# Patient Record
Sex: FEMALE | Race: ASIAN | NOT HISPANIC OR LATINO
[De-identification: names, ages, dates, MRNs, and addresses within clinical notes are randomized per-mention and may not be internally consistent; named-entity substitution may affect disease eponyms.]

---

## 2019-03-07 ENCOUNTER — RESULT REVIEW (OUTPATIENT)
Age: 54
End: 2019-03-07

## 2021-02-11 ENCOUNTER — APPOINTMENT (OUTPATIENT)
Dept: NEUROSURGERY | Facility: CLINIC | Age: 56
End: 2021-02-11

## 2021-06-17 ENCOUNTER — APPOINTMENT (OUTPATIENT)
Dept: NEUROSURGERY | Facility: CLINIC | Age: 56
End: 2021-06-17

## 2021-07-13 ENCOUNTER — APPOINTMENT (OUTPATIENT)
Dept: NEUROSURGERY | Facility: CLINIC | Age: 56
End: 2021-07-13
Payer: MEDICAID

## 2021-07-13 VITALS
DIASTOLIC BLOOD PRESSURE: 94 MMHG | TEMPERATURE: 97.3 F | OXYGEN SATURATION: 98 % | SYSTOLIC BLOOD PRESSURE: 135 MMHG | BODY MASS INDEX: 33.99 KG/M2 | HEART RATE: 105 BPM | WEIGHT: 180 LBS | HEIGHT: 61 IN

## 2021-07-13 PROCEDURE — 99203 OFFICE O/P NEW LOW 30 MIN: CPT

## 2021-07-14 NOTE — ASSESSMENT
[FreeTextEntry1] : She has severe pain after multilevel fusion. In the new MRI there is anterolisthesis of C7 on T1. I will have her do xrays with flexion extension to rule out instability

## 2021-07-14 NOTE — PHYSICAL EXAM
[Person] : oriented to person [Place] : oriented to place [Time] : oriented to time [Motor Handedness Right-Handed] : the patient is right hand dominant [Motor Strength Shoulders Right Weakness] : normal shoulder strength [Motor Strength Upper Extremities Right] : normal arm strength [Motor Strength Forearms Right Weakness] : normal forearm strength [Motor Strength Fingers Right Hand Weakness] : normal finger strength [Motor Strength Shoulders Left Weakness] : normal shoulder strength [Motor Strength Upper Extremities Left] : normal arm strength [Motor Strength Forearms Left Weakness] : normal forearm strength [Motor Strength Fingers Left Hand Weakness] : normal finger strength [4] : thumb flexion 4/5 [Sensation Tactile Decrease] : light touch was intact [Limited Balance] : the patient's balance was impaired

## 2021-07-14 NOTE — HISTORY OF PRESENT ILLNESS
[FreeTextEntry1] : She started having pain in 2008 and thereafter she had surgery in 10/2008  for neck pain a tingling of the right arm. The tingling got better.the neck pain stayed the same. The pain is getting worse, she feels pain in the arms and legs mainly when she lays down. The worse pain is in the back of the head, neck and shoulder. She has had 3 spinal injections in the last 10 years the last one was yesterday Dr Ortiz. She has had PT and has no help.Currently she has numbness and pain related to he amount of activity. The neck pain severity is 9/10 the worse. She has balance problems, and dexterity problems. Nothing helps the pain and it is worse with activity. She had a MRI and would like to go over the results

## 2021-10-21 ENCOUNTER — APPOINTMENT (OUTPATIENT)
Dept: NEUROLOGY | Facility: CLINIC | Age: 56
End: 2021-10-21

## 2022-02-17 ENCOUNTER — APPOINTMENT (OUTPATIENT)
Dept: NEUROSURGERY | Facility: CLINIC | Age: 57
End: 2022-02-17
Payer: MEDICARE

## 2022-02-17 VITALS
HEART RATE: 99 BPM | SYSTOLIC BLOOD PRESSURE: 131 MMHG | HEIGHT: 61 IN | WEIGHT: 179 LBS | BODY MASS INDEX: 33.79 KG/M2 | OXYGEN SATURATION: 98 % | DIASTOLIC BLOOD PRESSURE: 86 MMHG | TEMPERATURE: 97.8 F

## 2022-02-17 DIAGNOSIS — G89.29 CERVICALGIA: ICD-10-CM

## 2022-02-17 DIAGNOSIS — M54.2 CERVICALGIA: ICD-10-CM

## 2022-02-17 DIAGNOSIS — Z98.1 ARTHRODESIS STATUS: ICD-10-CM

## 2022-02-17 DIAGNOSIS — M54.12 RADICULOPATHY, CERVICAL REGION: ICD-10-CM

## 2022-02-17 PROCEDURE — 99213 OFFICE O/P EST LOW 20 MIN: CPT

## 2022-02-17 NOTE — REVIEW OF SYSTEMS
[As Noted in HPI] : as noted in HPI [Arm Weakness] : arm weakness [Numbness] : numbness [Tingling] : tingling [Abnormal Sensation] : an abnormal sensation [Negative] : Heme/Lymph

## 2022-02-18 NOTE — HISTORY OF PRESENT ILLNESS
[FreeTextEntry1] : 02/17:22: The patient is a 55 y/o, female, with a hx of ACDF C4-7 with chronic neck pain. She rates the pain a 10/10. She has pain radiating to b/l UE. She has numbness, weakness, and tingling of arms and hands. Her pain is worse lying down. She feels as if she has no support in her neck. She also has tingling of b/l LE. She has LOB. She has done PT and received many injections to her cervical spine. Last injection 11/21 by Dr. Ortiz\par \par 07/21: She started having pain in 2008 and thereafter she had surgery in 10/2008  for neck pain a tingling of the right arm. The tingling got better.the neck pain stayed the same. The pain is getting worse, she feels pain in the arms and legs mainly when she lays down. The worse pain is in the back of the head, neck and shoulder. She has had 3 spinal injections in the last 10 years the last one was yesterday Dr Ortiz. She has had PT and has no help.Currently she has numbness and pain related to he amount of activity. The neck pain severity is 9/10 the worse. She has balance problems, and dexterity problems. Nothing helps the pain and it is worse with activity. She had a MRI and would like to go over the results

## 2022-02-18 NOTE — REASON FOR VISIT
[Follow-Up: _____] : a [unfilled] follow-up visit [Consultation] : a consultation visit [FreeTextEntry1] : neck pain

## 2022-02-18 NOTE — ASSESSMENT
[FreeTextEntry1] : The patient is a 55 y/o, female with a hx of multilevel ACDF. She has weakness of UE and 10/10 pain. The pt has attempted conservative management with PT and injections, but surgery at this time is not recommended due to degree of severity and possible complications. Risks associated with surgery discussed. Counseled pt, advised she continue PT. Recommend activity modification and possible injection.

## 2023-07-26 NOTE — PHYSICAL EXAM
Statement Selected [Person] : oriented to person [Place] : oriented to place [Time] : oriented to time [Motor Handedness Right-Handed] : the patient is right hand dominant [Limited Balance] : the patient's balance was impaired [General Appearance - Alert] : alert [General Appearance - In No Acute Distress] : in no acute distress [Oriented To Time, Place, And Person] : oriented to person, place, and time [Impaired Insight] : insight and judgment were intact [Affect] : the affect was normal [Sensation Tactile Decrease] : light touch was intact [Abnormal Walk] : normal gait [2+] : Patella left 2+ [Restricted] : was restricted [Pain] : was painful [No Visual Abnormalities] : no visible abnormailities [No Tenderness to Palpation] : no spine tenderness on palpation [4] : 4/5 Small Finger Abduction (T1) [Ulnar N.] : tactile decrease in the Ulnar nerve distribution [5] : 5/5 Ankle Plantar Flexion (S1) [Straight-Leg Raise Test - Left] : straight leg raise of the left leg was negative [Straight-Leg Raise Test - Right] : straight leg raise  of the right leg was negative [Motor Strength Shoulders Right Weakness] : normal shoulder strength [Motor Strength Upper Extremities Right] : normal arm strength [Motor Strength Forearms Right Weakness] : normal forearm strength [Motor Strength Fingers Right Hand Weakness] : normal finger strength [Motor Strength Shoulders Left Weakness] : normal shoulder strength [Motor Strength Upper Extremities Left] : normal arm strength [Motor Strength Forearms Left Weakness] : normal forearm strength [Motor Strength Fingers Left Hand Weakness] : normal finger strength